# Patient Record
Sex: MALE | Race: OTHER
[De-identification: names, ages, dates, MRNs, and addresses within clinical notes are randomized per-mention and may not be internally consistent; named-entity substitution may affect disease eponyms.]

---

## 2018-11-19 ENCOUNTER — HOSPITAL ENCOUNTER (EMERGENCY)
Dept: HOSPITAL 25 - UCEAST | Age: 38
Discharge: HOME | End: 2018-11-19
Payer: COMMERCIAL

## 2018-11-19 VITALS — SYSTOLIC BLOOD PRESSURE: 155 MMHG | DIASTOLIC BLOOD PRESSURE: 100 MMHG

## 2018-11-19 DIAGNOSIS — B34.9: Primary | ICD-10-CM

## 2018-11-19 DIAGNOSIS — E10.9: ICD-10-CM

## 2018-11-19 DIAGNOSIS — Z87.891: ICD-10-CM

## 2018-11-19 PROCEDURE — 99212 OFFICE O/P EST SF 10 MIN: CPT

## 2018-11-19 PROCEDURE — G0463 HOSPITAL OUTPT CLINIC VISIT: HCPCS

## 2018-11-19 NOTE — UC
General HPI





- HPI Summary


HPI Summary: 





C/O congestion and phelgm for the past 6-7 days.  Intially had a fever that has 

since resolved.  Increase in phelgm worse in the morning.  Feels that the 

phelgm is in his chest. Some sinus tenderness and congestion but his congestion 

has overall improved.  Coughing has improved.  No SOB or CP.  Did vomit 

initially but no further vomiting.  No diarrhea.  Has been doing sudafed, 

mucinex and saline nasal rinses with minimal improvement.  PMHx: Type 1 DM  Meds

: Reviewed  FMHx; NC 





- History of Current Complaint


Chief Complaint: UCGeneralIllness


Stated Complaint: CONGESTION


Time Seen by Provider: 11/19/18 14:18


Pain Intensity: 6





- Allergy/Home Medications


Allergies/Adverse Reactions: 


 Allergies











Allergy/AdvReac Type Severity Reaction Status Date / Time


 


No Known Allergies Allergy   Verified 11/19/18 14:21











Home Medications: 


 Home Medications





Insulin Glulisine [Apidra] 100 unit SQ DAILY WITH MEAL 11/19/18 [History 

Confirmed 11/19/18]











PMH/Surg Hx/FS Hx/Imm Hx





- Surgical History


Surgical History: Yes


Surgery Procedure, Year, and Place: septum





- Social History


Alcohol Use: Occasionally


Substance Use Type: None


Smoking Status (MU): Former Smoker


When Did the Patient Quit Smoking/Using Tobacco: 2010


Household Exposure Type: Cigarettes





Review of Systems


All Other Systems Reviewed And Are Negative: Yes


Is Patient Immunocompromised?: No





- Comments


Additional Review of Systems Comments: 





Per HPI 





Physical Exam


Triage Information Reviewed: Yes


Appearance: Well-Appearing


Vital Signs: 


 Initial Vital Signs











Temp  98.3 F   11/19/18 14:15


 


Pulse  75   11/19/18 14:15


 


Resp  18   11/19/18 14:15


 


BP  155/100   11/19/18 14:15


 


Pulse Ox  99   11/19/18 14:15











Vital Signs Reviewed: Yes


Eyes: Positive: Conjunctiva Clear


ENT: Positive: Nasal congestion, Tonsillar swelling, Hoarse voice, Uvula midline


Neck: Positive: Supple, Nontender


Respiratory: Positive: Lungs clear, Normal breath sounds, No respiratory 

distress


Cardiovascular: Positive: RRR, No Murmur


Skin Exam: Normal





Course/Dx





- Course


Course Of Treatment: This is a 38 yr old with congestion and phelgm.  

Assessment.  Discussed viral syndrome and duration of course at length.  No 

indication for antibiotics.  Plan.  Recommend trial of Afrin nasal spray for 2- 

3 days.  Also start Flonase as directed.  Tessalon tabs for cough.  Continue to 

encourage fluids and monitor blood sugar.  If symptoms persist, worsen or 

develop persistent fevers, call primary for further evaluation





- Differential Dx - Multi-Symptom


Provider Diagnoses: viral syndrome





Discharge





- Sign-Out/Discharge


Documenting (check all that apply): Patient Departure


All imaging exams completed and their final reports reviewed: No Studies





- Discharge Plan


Condition: Good


Disposition: HOME


Prescriptions: 


Benzonatate CAP* [Tessalon 100 MG CAP*] 100 mg PO TID PRN #60 cap


 PRN Reason: Cough


Fluticasone NASAL SPRAY 50MCG* [Flonase NASAL SPRAY 50MCG*] 2 spray BOTH NARES 

DAILY #1 btl


Patient Education Materials:  Viral Syndrome (ED)


Referrals: 


Stan Biswas MD [Primary Care Provider] - 


Additional Instructions: 


Recommend trial of Afrin nasal spray for 2- 3 days 


Also start Flonase as directed 


Tessalon tabs for cough 


Continue to encourage fluids and monitor blood sugar 


If symptoms persist, worsen or develop persistent fevers, call primary for 

further evaluation 





- Billing Disposition and Condition


Condition: GOOD


Disposition: Home

## 2019-02-10 ENCOUNTER — HOSPITAL ENCOUNTER (EMERGENCY)
Dept: HOSPITAL 25 - UCEAST | Age: 39
Discharge: HOME | End: 2019-02-10
Payer: COMMERCIAL

## 2019-02-10 VITALS — DIASTOLIC BLOOD PRESSURE: 87 MMHG | SYSTOLIC BLOOD PRESSURE: 136 MMHG

## 2019-02-10 DIAGNOSIS — Z87.891: ICD-10-CM

## 2019-02-10 DIAGNOSIS — B34.9: ICD-10-CM

## 2019-02-10 DIAGNOSIS — E10.9: ICD-10-CM

## 2019-02-10 DIAGNOSIS — J32.9: Primary | ICD-10-CM

## 2019-02-10 LAB
FLUAV RNA SPEC QL NAA+PROBE: NEGATIVE
FLUBV RNA SPEC QL NAA+PROBE: NEGATIVE

## 2019-02-10 PROCEDURE — 99212 OFFICE O/P EST SF 10 MIN: CPT

## 2019-02-10 PROCEDURE — 87651 STREP A DNA AMP PROBE: CPT

## 2019-02-10 PROCEDURE — G0463 HOSPITAL OUTPT CLINIC VISIT: HCPCS

## 2019-02-10 NOTE — UC
Respiratory Complaint HPI





- HPI Summary


HPI Summary: 


Patient  is a 38 year old gentleman , who  present today  to the urgent care 

with  upper respiratory symptoms for past 2-3 days.  Onset on Thursday night/

Friday morning, reports some sinus pressure, throat pain and ears being full 

and congested.


Also has nonproductive cough and postnasal drip.  Denies any headaches


His wife was diagnosed with flu earlier this week.


He has had a flu shot in October


No fevers at home


Denies  chest pain or shortness of breath .   Denies any abdominal pain , 

nausea or vomiting , diarrhea or constipation.  














- History of Current Complaint


Chief Complaint: UCGeneralIllness


Stated Complaint: FLU SYMPTOMS


Time Seen by Provider: 02/10/19 12:05


Hx Obtained From: Patient


Pain Intensity: 9





- Allergies/Home Medications


Allergies/Adverse Reactions: 


 Allergies











Allergy/AdvReac Type Severity Reaction Status Date / Time


 


No Known Allergies Allergy   Verified 02/10/19 11:58














PMH/Surg Hx/FS Hx/Imm Hx





- Additional Past Medical History


Additional PMH: 


Type 1 diabetes mellitus


Hyperlipidemia


Sinusitis





Previously Healthy: Yes





- Surgical History


Surgical History: Yes


Surgery Procedure, Year, and Place: septum





- Social History


Alcohol Use: Occasionally


Substance Use Type: None


Smoking Status (MU): Former Smoker


When Did the Patient Quit Smoking/Using Tobacco: 2010


Household Exposure Type: Cigarettes





Review of Systems


All Other Systems Reviewed And Are Negative: Yes


Constitutional: Positive: Negative


Skin: Positive: Negative


Eyes: Positive: Negative


ENT: Positive: Sore Throat, Ear Ache - Bilateral ear fullness, Nasal Discharge, 

Sinus Congestion, Sinus Pain/Tenderness


Respiratory: Positive: Cough - Nonproductive


Cardiovascular: Positive: Negative


Gastrointestinal: Positive: Negative


Genitourinary: Positive: Negative


Motor: Positive: Negative


Neurovascular: Positive: Negative


Musculoskeletal: Positive: Negative


Neurological: Positive: Negative


Psychological: Positive: Negative


Is Patient Immunocompromised?: No





Physical Exam





- Summary


Physical Exam Summary: 





Physical Exam: 


Const: Appears well. No signs of apparent distress present. Alert and oriented 

x 3.


Musculo: Walks with a normal gait.


Head/Face: Atraumatic, normocephalic on inspection.


Eyes: EOMI and PERRLA  in both eyes. Conjunctivae clear. No discharge noted 


ENT: Hearing normal, TM slightly erythematous on left and bulging when compared 

to the right side.


No tenderness  to palpation on maxillary and frontal sinus. 


Mild pharyngeal erythema, no exudates . Uvula is midline. 


There is tender anterior cervical lymphadenopathy noted. 


Respiratory: Respirations are unlabored.  Lungs clear to auscultation 

bilaterally, no  wheezing , rhonchi or rales noted . 


CVS:  Regular rate and Rhythm, S1S2 normal , no murmurs identified. 


Extremities: Peripheral circulation is grossly normal. Pulses 2+


Abdomen : Soft non tender ,  nondistended ,  Bowel sounds present .  No 

guarding , rebound tenderness  or  rigidity noted. 


Skin: No lesions or rash located on the upper extremities or on the lower 

extremities. 


Neuro: Cranial nerves  II to XII intact, motor and sensory intact.  DTR Intact  

bilaterally. Mood is normal. Affect is normal.





Triage Information Reviewed: Yes


Vital Signs: 


 Initial Vital Signs











Temp  97.2 F   02/10/19 11:55


 


Pulse  91   02/10/19 11:55


 


Resp  18   02/10/19 11:55


 


BP  136/87   02/10/19 11:55


 


Pulse Ox  99   02/10/19 11:55











Vital Signs Reviewed: Yes





UC Diagnostic Evaluation





- Laboratory


O2 Sat by Pulse Oximetry: 99





Respiratory Course/Dx





- Course


Course Of Treatment: During the visit today,  we  obtained  a rapid flu and 

strep test which were negative .  We discussed the findings- Likely a viral 

syndrome, possible early sinusitis.  Left ear is slightly bulging and red but 

that is likely viral.  Possibility of early otitis media is minimal.  I will 

prescribe medications to the pharmacy.  (decongestant and nasal spray )as 

prescribed to the pharmacy.Hold off on filling the antibiotic(Augmentin) for 

next 2 days and fill it if no improvement until Tuesday.  Follow up with your 

primary care doctor in 1 week.  Patient expressed understanding .





- Differential Dx/Diagnosis


Provider Diagnosis: 


 Viral syndrome, Sinusitis








Discharge





- Sign-Out/Discharge


Documenting (check all that apply): Patient Departure


All imaging exams completed and their final reports reviewed: No Studies





- Discharge Plan


Condition: Stable


Disposition: HOME


Prescriptions: 


Amoxicillin/Clavulanate TAB* [Augmentin *] 875 mg PO BID 14 Days #28 tab


Fluticasone NASAL SPRAY 50MCG* [Flonase NASAL SPRAY 50MCG*] 1 spray BOTH NARES 

DAILY #1 btl


Loratadine/Pseudoephedrine [Claritin-D 12 Hour] 1 tab PO BID 7 Days #14 tab


Patient Education Materials:  Sinusitis (ED), Viral Syndrome (ED)


Print Language: ENGLISH


Referrals: 


Stan Biswas MD [Primary Care Provider] - 3 Days


Additional Instructions: 


Likely a viral syndrome, possible early sinusitis


Please start taking her medications (decongestant and nasal spray )as 

prescribed to the pharmacy.


Hold off on filling the antibiotic(Augmentin) for next 2 days and fill it if no 

improvement until Tuesday. 


Follow up with your primary care doctor in 1 week


Patients blood pressure slightly high in Urgent care today  , plan follow up 

with PCP for better control 


Return to Urgent care / ER if symptoms get worse. 








- Billing Disposition and Condition


Condition: STABLE


Disposition: Home

## 2019-09-09 ENCOUNTER — HOSPITAL ENCOUNTER (EMERGENCY)
Dept: HOSPITAL 25 - UCEAST | Age: 39
Discharge: HOME | End: 2019-09-09
Payer: COMMERCIAL

## 2019-09-09 VITALS — DIASTOLIC BLOOD PRESSURE: 74 MMHG | SYSTOLIC BLOOD PRESSURE: 119 MMHG

## 2019-09-09 DIAGNOSIS — Z87.891: ICD-10-CM

## 2019-09-09 DIAGNOSIS — E11.9: ICD-10-CM

## 2019-09-09 DIAGNOSIS — M54.5: Primary | ICD-10-CM

## 2019-09-09 PROCEDURE — G0463 HOSPITAL OUTPT CLINIC VISIT: HCPCS

## 2019-09-09 PROCEDURE — 99212 OFFICE O/P EST SF 10 MIN: CPT

## 2019-09-09 NOTE — UC
Back Pain HPI





- HPI Summary


HPI Summary: 





38 yo male presents with low back pain. He tells me that 1 week ago he woke up 

from a night's sleep with right lower back/SI pain. He thinks he slept 

awkwardly due to his CPAP. He took ibuprofen 800mg and his pain went completely 

away for a day, but came back the next day. Worse with movements. Denies 

numbness, tingling, saddle anesthesia, loss of bowel/bladder control. 





- History of Current Complaint


Chief Complaint: UCBackPain


Stated Complaint: LOW BACK PAIN/ RIGHT BACK PAIN


Time Seen by Provider: 09/09/19 15:10


Hx Obtained From: Patient


Onset/Duration: Sudden Onset


Severity Initially: Moderate


Severity Currently: Moderate


Pain Intensity: 6


Pain Scale Used: 0-10 Numeric





- Allergies/Home Medications


Allergies/Adverse Reactions: 


 Allergies











Allergy/AdvReac Type Severity Reaction Status Date / Time


 


No Known Allergies Allergy   Verified 09/09/19 15:03











Home Medications: 


 Home Medications





Ibuprofen [Advil] 800 mg PO Q8HR PRN 09/09/19 [History Confirmed 09/09/19]











PMH/Surg Hx/FS Hx/Imm Hx





- Additional Past Medical History


Additional PMH: 





Diabetes


Allergies





- Surgical History


Surgical History: Yes


Surgery Procedure, Year, and Place: deviated septum





- Family History


Known Family History: Positive: Non-Contributory





- Social History


Occupation: Employed Full-time


Lives: With Family


Alcohol Use: Occasionally


Substance Use Type: None


Smoking Status (MU): Former Smoker


When Did the Patient Quit Smoking/Using Tobacco: 2010


Household Exposure Type: Cigarettes





Review of Systems


All Other Systems Reviewed And Are Negative: No


Constitutional: Positive: Negative


Skin: Positive: Negative


Respiratory: Positive: Negative


Cardiovascular: Positive: Negative


Gastrointestinal: Positive: Negative


Genitourinary: Positive: Negative


Musculoskeletal: Positive: Other: - Low back pain


Neurological: Positive: Negative


Psychological: Positive: Negative





Physical Exam





- Summary


Physical Exam Summary: 





 


GENERAL: NAD. WDWN. No pain distress.


SKIN: No rashes, sores, lesions, or open wounds.


NECK: Supple. FROM. Nontender. No lymphadenopathy. 


CHEST:  CTAB. No r/r/w. No accessory muscle use. Breathing comfortably and in 

no distress.


CV:  RRR. Without m/r/g. Pulses intact. Cap refill <2seconds


MSK: TTP over RIGHT lumbar paraspinal muscles and RIGHT SI. Pain with flexion 

and extension of spine. Positive SLR on right without radiation. Strength 5/5 B/

L LEs including dorsiflexion and plantar flexion. FROM B/L LEs. No edema. 


NEURO: Alert. Sensations intact B/L LEs L3-S1. Reflexes intact


PSYCH: Age appropriate behavior.





Triage Information Reviewed: Yes


Vital Signs: 


 Initial Vital Signs











Temp  98.7 F   09/09/19 15:04


 


Pulse  84   09/09/19 15:04


 


Resp  18   09/09/19 15:04


 


BP  119/74   09/09/19 15:04


 


Pulse Ox  99   09/09/19 15:04











Vital Signs Reviewed: Yes





Back Pain Course/Dx





- Course


Course Of Treatment: 





Suspect sciatica.


Will rx for prednisone and flexeril and have him schedule with physical 

therapy. Advised to refrain from NSAIDs given his diabetes.


Recommend f/u in 1 week with PCP for a recheck





- Differential Dx/Diagnosis


Provider Diagnosis: 


 Right sided sciatica








Discharge ED





- Sign-Out/Discharge


Documenting (check all that apply): Patient Departure


All imaging exams completed and their final reports reviewed: No Studies





- Discharge Plan


Condition: Stable


Disposition: HOME


Prescriptions: 


Cyclobenzaprine TAB* [Flexeril 10 MG TAB*] 10 mg PO BID PRN #14 tab


 PRN Reason: Pain - Moderate


predniSONE TAB* [Deltasone 20 MG TAB*] 40 mg PO DAILY #8 tab


Patient Education Materials:  Sciatica (ED), Piriformis Syndrome (ED), Lower 

Back Exercises (ED)


Referrals: 


Stan Biswas MD [Primary Care Provider] - 1 Week


Additional Instructions: 


If you develop a fever, shortness of breath, chest pain, new or worsening 

symptoms - please call your PCP or go to the ED immediately.


 


May continue taking tylenol for discomfort as directed





I recommend that you call Physical Therapy to schedule an appointment for 

further treatment of your back pain





Please try the flexeril at bedtime first, as this may make you drowsy. Do not 

drive while taking this medication.





- Billing Disposition and Condition


Condition: STABLE


Disposition: Home